# Patient Record
Sex: MALE | Race: OTHER | HISPANIC OR LATINO | URBAN - METROPOLITAN AREA
[De-identification: names, ages, dates, MRNs, and addresses within clinical notes are randomized per-mention and may not be internally consistent; named-entity substitution may affect disease eponyms.]

---

## 2021-10-13 ENCOUNTER — OFFICE VISIT (OUTPATIENT)
Dept: FAMILY MEDICINE CLINIC | Facility: CLINIC | Age: 46
End: 2021-10-13

## 2021-10-13 VITALS
HEIGHT: 67 IN | TEMPERATURE: 98.5 F | HEART RATE: 62 BPM | OXYGEN SATURATION: 98 % | RESPIRATION RATE: 16 BRPM | SYSTOLIC BLOOD PRESSURE: 136 MMHG | BODY MASS INDEX: 27.91 KG/M2 | DIASTOLIC BLOOD PRESSURE: 82 MMHG | WEIGHT: 177.8 LBS

## 2021-10-13 DIAGNOSIS — Z00.00 ANNUAL PHYSICAL EXAM: Primary | ICD-10-CM

## 2021-10-13 DIAGNOSIS — Z02.89 HISTORY AND PHYSICAL EXAMINATION, IMMIGRATION: ICD-10-CM

## 2021-10-13 DIAGNOSIS — Z56.0 UNEMPLOYMENT: ICD-10-CM

## 2021-10-13 DIAGNOSIS — Z13.6 SCREENING FOR CARDIOVASCULAR CONDITION: ICD-10-CM

## 2021-10-13 DIAGNOSIS — Z13.1 SCREENING FOR DIABETES MELLITUS: ICD-10-CM

## 2021-10-13 DIAGNOSIS — Z13.220 SCREENING FOR CHOLESTEROL LEVEL: ICD-10-CM

## 2021-10-13 PROCEDURE — 99386 PREV VISIT NEW AGE 40-64: CPT | Performed by: FAMILY MEDICINE

## 2021-10-13 SDOH — ECONOMIC STABILITY - INCOME SECURITY: UNEMPLOYMENT, UNSPECIFIED: Z56.0

## 2021-10-14 ENCOUNTER — PATIENT OUTREACH (OUTPATIENT)
Dept: FAMILY MEDICINE CLINIC | Facility: CLINIC | Age: 46
End: 2021-10-14

## 2021-11-11 ENCOUNTER — APPOINTMENT (OUTPATIENT)
Dept: LAB | Facility: CLINIC | Age: 46
End: 2021-11-11

## 2021-11-11 DIAGNOSIS — Z13.220 SCREENING FOR CHOLESTEROL LEVEL: ICD-10-CM

## 2021-11-11 DIAGNOSIS — Z13.1 SCREENING FOR DIABETES MELLITUS: ICD-10-CM

## 2021-11-11 DIAGNOSIS — Z13.6 SCREENING FOR CARDIOVASCULAR CONDITION: ICD-10-CM

## 2021-11-11 LAB
ALBUMIN SERPL BCP-MCNC: 3.6 G/DL (ref 3.5–5)
ALP SERPL-CCNC: 101 U/L (ref 46–116)
ALT SERPL W P-5'-P-CCNC: 71 U/L (ref 12–78)
ANION GAP SERPL CALCULATED.3IONS-SCNC: 6 MMOL/L (ref 4–13)
AST SERPL W P-5'-P-CCNC: 29 U/L (ref 5–45)
BILIRUB SERPL-MCNC: 0.52 MG/DL (ref 0.2–1)
BUN SERPL-MCNC: 15 MG/DL (ref 5–25)
CALCIUM SERPL-MCNC: 8.9 MG/DL (ref 8.3–10.1)
CHLORIDE SERPL-SCNC: 108 MMOL/L (ref 100–108)
CHOLEST SERPL-MCNC: 178 MG/DL (ref 50–200)
CO2 SERPL-SCNC: 26 MMOL/L (ref 21–32)
CREAT SERPL-MCNC: 0.75 MG/DL (ref 0.6–1.3)
GFR SERPL CREATININE-BSD FRML MDRD: 109 ML/MIN/1.73SQ M
GLUCOSE P FAST SERPL-MCNC: 95 MG/DL (ref 65–99)
HDLC SERPL-MCNC: 44 MG/DL
LDLC SERPL CALC-MCNC: 106 MG/DL (ref 0–100)
NONHDLC SERPL-MCNC: 134 MG/DL
POTASSIUM SERPL-SCNC: 3.7 MMOL/L (ref 3.5–5.3)
PROT SERPL-MCNC: 7.6 G/DL (ref 6.4–8.2)
SODIUM SERPL-SCNC: 140 MMOL/L (ref 136–145)
TRIGL SERPL-MCNC: 139 MG/DL

## 2021-11-11 PROCEDURE — 80061 LIPID PANEL: CPT

## 2021-11-11 PROCEDURE — 80053 COMPREHEN METABOLIC PANEL: CPT

## 2021-11-11 PROCEDURE — 36415 COLL VENOUS BLD VENIPUNCTURE: CPT

## 2022-03-09 ENCOUNTER — OFFICE VISIT (OUTPATIENT)
Dept: FAMILY MEDICINE CLINIC | Facility: CLINIC | Age: 47
End: 2022-03-09

## 2022-03-09 VITALS
TEMPERATURE: 97.6 F | DIASTOLIC BLOOD PRESSURE: 82 MMHG | SYSTOLIC BLOOD PRESSURE: 128 MMHG | WEIGHT: 165.6 LBS | BODY MASS INDEX: 25.99 KG/M2 | RESPIRATION RATE: 18 BRPM | HEART RATE: 62 BPM | OXYGEN SATURATION: 99 % | HEIGHT: 67 IN

## 2022-03-09 DIAGNOSIS — Z01.20 DENTAL EXAMINATION: ICD-10-CM

## 2022-03-09 DIAGNOSIS — M65.4 DE QUERVAIN'S TENOSYNOVITIS, RIGHT: Primary | ICD-10-CM

## 2022-03-09 PROCEDURE — 99213 OFFICE O/P EST LOW 20 MIN: CPT | Performed by: FAMILY MEDICINE

## 2022-03-09 NOTE — PROGRESS NOTES
CLINIC VISIT NOTE - 82 [address] Tracey 51 y o  male MRN: [id number]  Encounter: [id number],  Primary Care Provider: Rosalinda Cueva MD      Date: 03/09/22    Assessments & Plans:     Problem List Items Addressed This Visit        Musculoskeletal and Integument    De Quervain's tenosynovitis, right - Primary     Patient presenting for right wrist pain, left hand dominant  Pain began 15-20 days ago while lifting up heavy packages  The patient works as a  and works all day packing, transporting boxes  Pain is triggered by movement, namely extension, ulnar and radial deviation  Pain is shooting, aching in nature and radiates from the wrist to just below the elbow  Improves with naproxen and rest  Sensation and muscle strength intact  · Instructed patient to continue using naproxen as needed for pain relief  He was instructed to take the naproxen with meals  · Also recommended stretching exercises for the wrist  · Recommended using a splint and to rest the wrist as often as he can  Discussed that resting the wrist is the mainstay treatment and that it will take time to heal   · He may also use a heating pad or a lidocaine patch            Other    Dental examination     Patient requested referral for dentistry  Referral placed         Relevant Orders    Ambulatory Referral to Dentistry          Patient Active Problem List   Diagnosis    Annual physical exam    De Quervain's tenosynovitis, right    Dental examination         Recent Visits:     Recent Visits  No visits were found meeting these conditions  Showing recent visits within past 7 days and meeting all other requirements  Today's Visits  Date Type Provider Dept   03/09/22 Office Visit Rosalinda Cueva MD [facility] today's visits and meeting all other requirements  Future Appointments  No visits were found meeting these conditions    Showing future appointments within next 150 days and meeting all other requirements      Subjective:     Chief Complaint   Patient presents with    Wrist Pain     R side, started 20 days ago, taking naproxen and ibuprofen for pain which is helping       HPI:  48-year-old male presenting for right wrist pain, patient is left-hand dominant  The patient works as a  and is often packing, transporting heavy packages daily  The pain started around 15-20 days ago and is worse with extension, ulnar/radial deviation of the wrist   The pain is shooting, aching in nature and radiates from the radial side of the wrist to the elbow  Sensation and muscle strength are all intact  The pain is worsened with movement and improves with rest   He has been taking ibuprofen and naproxen, which provides relief  Review of System:     Review of systems was reviewed and negative unless stated above in HPI/24-hour events     History:   History reviewed  No pertinent past medical history  Past Surgical History:   Procedure Laterality Date    APPENDECTOMY      HERNIA REPAIR       Social History   Social History     Substance and Sexual Activity   Alcohol Use Not Currently    Comment: social drinker     Social History     Substance and Sexual Activity   Drug Use Never     Social History     Tobacco Use   Smoking Status Never Smoker   Smokeless Tobacco Never Used       Medications & Allergies:   No Known Allergies    PTA Medications:  No current outpatient medications on file prior to visit  No current facility-administered medications on file prior to visit  Objective & Vitals:   Vitals: /82 (BP Location: Right arm, Patient Position: Sitting, Cuff Size: Large)   Pulse 62   Temp 97 6 °F (36 4 °C) (Temporal)   Resp 18   Ht 5' 7" (1 702 m)   Wt 75 1 kg (165 lb 9 6 oz)   SpO2 99%   BMI 25 94 kg/m²         Physical Exam:     Physical Exam  Vitals reviewed  Constitutional:       General: He is not in acute distress  Appearance: Normal appearance   He is not ill-appearing  HENT:      Head: Normocephalic and atraumatic  Nose: Nose normal    Eyes:      Extraocular Movements: Extraocular movements intact  Conjunctiva/sclera: Conjunctivae normal    Cardiovascular:      Rate and Rhythm: Normal rate and regular rhythm  Heart sounds: Normal heart sounds  No murmur heard  Pulmonary:      Effort: Pulmonary effort is normal  No respiratory distress  Breath sounds: Normal breath sounds  Musculoskeletal:         General: Swelling and tenderness present  Cervical back: Normal range of motion and neck supple  Comments: No tenderness on palpation  Range of motion of the right wrist is limited due to pain, worse with extension, ulnar/radial deviation  Sensation and muscle strength intact   Skin:     General: Skin is warm and dry  Findings: No lesion  Neurological:      Mental Status: He is alert and oriented to person, place, and time  Psychiatric:         Mood and Affect: Mood normal          Behavior: Behavior normal            Future Labs & Appointments:     FUTURE LABS:  Lab Frequency Next Occurrence       FUTURE CONFIRMED APPOINTMENTS:  No future appointments  Johnny Allred MD  PGY-1, Family Medicine  03/09/22  4:50 PM      Dear reader, please be aware that portions of my note contain dictated text  I have done my best to proof-read this note prior to signing  However, there may be occasional unnoticed errors pertaining to "sound-alike" words and/or grammar during my dictation process  If there is any words or information that is unclear or appears erroneous, please kindly let me know and I will clarify and/or addend my notes accordingly  Thank you for your understanding

## 2022-03-09 NOTE — PATIENT INSTRUCTIONS
Tendinitis   LO QUE NECESITA SABER:   La tendinitis es natali inflamación dolorosa o desprendimiento de los tendones  También se le conoce bárbara tendinopatía  La tendinitis jonel siempre ocurre en la rodilla, hombro, tobillo, cadera o codo  INSTRUCCIONES SOBRE EL [facility]ITAL[facility]:   Medicamentos:  · Los [employer] AINEs y el acetaminofén podrían disminuir la inflamación y el dolor  Estos medicamentos están disponibles sin receta médica  Pregunte qué medicina elver  Pregunte cuánto elver y cuándo  [address]  Los AINES y el acetaminofén podrían causar daños en el hígado o riñón si no se rajinder correctamente  Si usted quinn un medicamento anticoagulante, asegúrese de preguntarle a perez médico si los ANDRAE son seguros para usted  Sharifa siempre las instrucciones en la etiqueta del medicamento y [place] Cornelia indicaciones antes de usar el [employer]  · La [place] hermelindo medicamentos bárbara se le haya indicado  Consulte con perez médico si usted americo que perez medicamento no le está ayudando o si presenta efectos secundarios  Infórmele si es alérgico a cualquier medicamento  Mantenga natali lista actualizada de los [employer], las vitaminas y los productos herbales que quinn  Incluya los siguientes datos de los medicamentos: cantidad, frecuencia y motivo de administración  Traiga con usted la lista o los envases de las píldoras a hermelindo citas de seguimiento  Lleve la lista de los medicamentos con usted en angelica de natali emergencia  Control:  · Descanse el tendón según las indicaciones de perez médico para ayudarlo a sanar  Pregúntele a perez médico si debe evitar apoyar [employer]  · El hielo ayuda a disminuir la inflamación y el dolor  El hielo también puede contribuir a evitar el daño de los tejidos   Use natali compresa de hielo o ponga hielo triturado en natali bolsa de plástico  Aracely Oralia la bolsa con natali tolla y colóquela sobre el área afectada por 10 a 15 minutos por hora o según [address] dispositivos de [place], [employer] un alexandro, Evalene Bolognese, natali plantilla para zapatos o un yeso podrían ayudar a reducir el dolor  · La fisioterapia es posible que perez médico se lo pida  La terapia física podría usarse para enseñarle a usted ejercicios para ayudarle a mejorar el movimiento y la fuerza, y para disminuir el dolor  Usted también podría aprender a mejorar perez postura y formas de levantarse o ejercitarse correctamente  Prevención:  · Estire o caliente antes de realizar natali actividad física  · Jaja ejercicio regularmente para fortalecer los músculos que están alrededor de perez articulación  Acostúmbrese a natali rutina de ejercicios por las primeras 3 semanas para evitar otra lesión  Pregunte a perez médico acerca del mejor plan de ejercicio para usted  Descanse por completo después de cada actividad que realice  · Use el equipo correcto para practicar deportes y hacer ejercicio  Use natali férula o cinta especial para envolver hermelindo articulaciones débiles según indicaciones médicas  Acuda a hermelindo consultas de control con perez médico según le indicaron  Anote hermelindo preguntas para que se acuerde de hacerlas karson hermelindo visitas  Comuníquese con perez médico si:  · Usted tiene más dolor aun después de tomarse hermelindo medicamentos  · Usted tiene preguntas o inquietudes acerca de perez condición o cuidado  Regrese a la gaurav de emergencias si:  · Usted tiene más enrojecimiento alrededor de la articulación o inflamación en la articulación  · De repente, no puede  perez articulación  © Copyright [employer] 2022 Information is for End User's use only and may not be sold, redistributed or otherwise used for commercial purposes  All illustrations and images included in CareNotes® are the copyrighted property of A Josefina ARRIETA  or [address] [place] [address] es sólo para uso en educación  Perez intención no es darle un consejo médico sobre enfermedades o tratamientos   Colsulte con perez Cleaster Corolla antes de seguir cualquier régimen médico para saber si es seguro y efectivo para usted

## 2022-03-09 NOTE — ASSESSMENT & PLAN NOTE
Patient presenting for right wrist pain, left hand dominant  Pain began 15-20 days ago while lifting up heavy packages  The patient works as a  and works all day packing, transporting boxes  Pain is triggered by movement, namely extension, ulnar and radial deviation  Pain is shooting, aching in nature and radiates from the wrist to just below the elbow  Improves with naproxen and rest  Sensation and muscle strength intact  · Instructed patient to continue using naproxen as needed for pain relief  He was instructed to take the naproxen with meals  · Also recommended stretching exercises for the wrist  · Recommended using a splint and to rest the wrist as often as he can    Discussed that resting the wrist is the mainstay treatment and that it will take time to heal   · He may also use a heating pad or a lidocaine patch